# Patient Record
Sex: FEMALE | Race: OTHER | HISPANIC OR LATINO
[De-identification: names, ages, dates, MRNs, and addresses within clinical notes are randomized per-mention and may not be internally consistent; named-entity substitution may affect disease eponyms.]

---

## 2017-08-08 ENCOUNTER — APPOINTMENT (OUTPATIENT)
Dept: SURGICAL ONCOLOGY | Facility: CLINIC | Age: 72
End: 2017-08-08
Payer: MEDICARE

## 2017-08-08 ENCOUNTER — RESULT REVIEW (OUTPATIENT)
Age: 72
End: 2017-08-08

## 2017-08-08 PROBLEM — Z00.00 ENCOUNTER FOR PREVENTIVE HEALTH EXAMINATION: Status: ACTIVE | Noted: 2017-08-08

## 2017-08-08 PROCEDURE — 99205 OFFICE O/P NEW HI 60 MIN: CPT | Mod: 25

## 2017-08-08 PROCEDURE — 19083 BX BREAST 1ST LESION US IMAG: CPT

## 2017-08-09 ENCOUNTER — OTHER (OUTPATIENT)
Age: 72
End: 2017-08-09

## 2017-08-15 ENCOUNTER — APPOINTMENT (OUTPATIENT)
Dept: SURGICAL ONCOLOGY | Facility: CLINIC | Age: 72
End: 2017-08-15
Payer: MEDICARE

## 2017-08-15 PROCEDURE — 99214 OFFICE O/P EST MOD 30 MIN: CPT

## 2017-08-16 ENCOUNTER — OUTPATIENT (OUTPATIENT)
Dept: OUTPATIENT SERVICES | Facility: HOSPITAL | Age: 72
LOS: 1 days | End: 2017-08-16
Payer: COMMERCIAL

## 2017-08-16 VITALS
OXYGEN SATURATION: 100 % | RESPIRATION RATE: 16 BRPM | DIASTOLIC BLOOD PRESSURE: 92 MMHG | HEART RATE: 60 BPM | SYSTOLIC BLOOD PRESSURE: 154 MMHG | WEIGHT: 216.05 LBS | TEMPERATURE: 98 F | HEIGHT: 64 IN

## 2017-08-16 DIAGNOSIS — N63 UNSPECIFIED LUMP IN BREAST: ICD-10-CM

## 2017-08-16 DIAGNOSIS — H26.9 UNSPECIFIED CATARACT: Chronic | ICD-10-CM

## 2017-08-16 DIAGNOSIS — D24.1 BENIGN NEOPLASM OF RIGHT BREAST: ICD-10-CM

## 2017-08-16 DIAGNOSIS — Z98.891 HISTORY OF UTERINE SCAR FROM PREVIOUS SURGERY: Chronic | ICD-10-CM

## 2017-08-16 DIAGNOSIS — Z01.818 ENCOUNTER FOR OTHER PREPROCEDURAL EXAMINATION: ICD-10-CM

## 2017-08-16 LAB
ALBUMIN SERPL ELPH-MCNC: 3.6 G/DL — SIGNIFICANT CHANGE UP (ref 3.5–5)
ALP SERPL-CCNC: 104 U/L — SIGNIFICANT CHANGE UP (ref 40–120)
ALT FLD-CCNC: 16 U/L DA — SIGNIFICANT CHANGE UP (ref 10–60)
ANION GAP SERPL CALC-SCNC: 9 MMOL/L — SIGNIFICANT CHANGE UP (ref 5–17)
APTT BLD: 27.9 SEC — SIGNIFICANT CHANGE UP (ref 27.5–37.4)
AST SERPL-CCNC: 12 U/L — SIGNIFICANT CHANGE UP (ref 10–40)
BILIRUB SERPL-MCNC: 0.7 MG/DL — SIGNIFICANT CHANGE UP (ref 0.2–1.2)
BUN SERPL-MCNC: 7 MG/DL — SIGNIFICANT CHANGE UP (ref 7–18)
CALCIUM SERPL-MCNC: 8.9 MG/DL — SIGNIFICANT CHANGE UP (ref 8.4–10.5)
CHLORIDE SERPL-SCNC: 104 MMOL/L — SIGNIFICANT CHANGE UP (ref 96–108)
CO2 SERPL-SCNC: 29 MMOL/L — SIGNIFICANT CHANGE UP (ref 22–31)
CREAT SERPL-MCNC: 0.57 MG/DL — SIGNIFICANT CHANGE UP (ref 0.5–1.3)
GLUCOSE SERPL-MCNC: 81 MG/DL — SIGNIFICANT CHANGE UP (ref 70–99)
HCT VFR BLD CALC: 41.8 % — SIGNIFICANT CHANGE UP (ref 34.5–45)
HGB BLD-MCNC: 13.1 G/DL — SIGNIFICANT CHANGE UP (ref 11.5–15.5)
INR BLD: 1 RATIO — SIGNIFICANT CHANGE UP (ref 0.88–1.16)
MCHC RBC-ENTMCNC: 29.9 PG — SIGNIFICANT CHANGE UP (ref 27–34)
MCHC RBC-ENTMCNC: 31.4 GM/DL — LOW (ref 32–36)
MCV RBC AUTO: 95.4 FL — SIGNIFICANT CHANGE UP (ref 80–100)
PLATELET # BLD AUTO: 325 K/UL — SIGNIFICANT CHANGE UP (ref 150–400)
POTASSIUM SERPL-MCNC: 3.8 MMOL/L — SIGNIFICANT CHANGE UP (ref 3.5–5.3)
POTASSIUM SERPL-SCNC: 3.8 MMOL/L — SIGNIFICANT CHANGE UP (ref 3.5–5.3)
PROT SERPL-MCNC: 7.9 G/DL — SIGNIFICANT CHANGE UP (ref 6–8.3)
PROTHROM AB SERPL-ACNC: 10.9 SEC — SIGNIFICANT CHANGE UP (ref 9.8–12.7)
RBC # BLD: 4.38 M/UL — SIGNIFICANT CHANGE UP (ref 3.8–5.2)
RBC # FLD: 13.7 % — SIGNIFICANT CHANGE UP (ref 10.3–14.5)
SODIUM SERPL-SCNC: 142 MMOL/L — SIGNIFICANT CHANGE UP (ref 135–145)
WBC # BLD: 6.1 K/UL — SIGNIFICANT CHANGE UP (ref 3.8–10.5)
WBC # FLD AUTO: 6.1 K/UL — SIGNIFICANT CHANGE UP (ref 3.8–10.5)

## 2017-08-16 PROCEDURE — 85610 PROTHROMBIN TIME: CPT

## 2017-08-16 PROCEDURE — 85730 THROMBOPLASTIN TIME PARTIAL: CPT

## 2017-08-16 PROCEDURE — 71020: CPT | Mod: 26

## 2017-08-16 PROCEDURE — 71046 X-RAY EXAM CHEST 2 VIEWS: CPT

## 2017-08-16 PROCEDURE — G0463: CPT

## 2017-08-16 PROCEDURE — 85027 COMPLETE CBC AUTOMATED: CPT

## 2017-08-16 PROCEDURE — 80053 COMPREHEN METABOLIC PANEL: CPT

## 2017-08-16 RX ORDER — SODIUM CHLORIDE 9 MG/ML
3 INJECTION INTRAMUSCULAR; INTRAVENOUS; SUBCUTANEOUS EVERY 8 HOURS
Qty: 0 | Refills: 0 | Status: DISCONTINUED | OUTPATIENT
Start: 2017-08-17 | End: 2017-08-17

## 2017-08-16 NOTE — H&P PST ADULT - FAMILY HISTORY
Mother  Still living? Yes, Estimated age: 81-90  Family history of diabetes mellitus in mother, Age at diagnosis: Age Unknown     Father  Still living? No  Family history of heart attack, Age at diagnosis: Age Unknown

## 2017-08-16 NOTE — H&P PST ADULT - RS GEN PE MLT RESP DETAILS PC
clear to auscultation bilaterally/normal/respirations non-labored/breath sounds equal/airway patent/good air movement

## 2017-08-16 NOTE — H&P PST ADULT - ASSESSMENT
72-year-old female with history of hypertension, diabetes mellitus type 2 comes to PST  with reports of right breast mass found on mammogram.

## 2017-08-16 NOTE — H&P PST ADULT - NSANTHOSAYNRD_GEN_A_CORE
No. TERRI screening performed.  STOP BANG Legend: 0-2 = LOW Risk; 3-4 = INTERMEDIATE Risk; 5-8 = HIGH Risk

## 2017-08-17 ENCOUNTER — APPOINTMENT (OUTPATIENT)
Dept: SURGICAL ONCOLOGY | Facility: HOSPITAL | Age: 72
End: 2017-08-17
Payer: MEDICARE

## 2017-08-17 ENCOUNTER — RESULT REVIEW (OUTPATIENT)
Age: 72
End: 2017-08-17

## 2017-08-17 ENCOUNTER — TRANSCRIPTION ENCOUNTER (OUTPATIENT)
Age: 72
End: 2017-08-17

## 2017-08-17 ENCOUNTER — OUTPATIENT (OUTPATIENT)
Dept: OUTPATIENT SERVICES | Facility: HOSPITAL | Age: 72
LOS: 1 days | Discharge: HOME | End: 2017-08-17
Payer: COMMERCIAL

## 2017-08-17 VITALS
RESPIRATION RATE: 12 BRPM | OXYGEN SATURATION: 97 % | DIASTOLIC BLOOD PRESSURE: 63 MMHG | TEMPERATURE: 97 F | SYSTOLIC BLOOD PRESSURE: 126 MMHG | HEART RATE: 52 BPM

## 2017-08-17 VITALS
RESPIRATION RATE: 14 BRPM | WEIGHT: 216.05 LBS | HEIGHT: 64 IN | SYSTOLIC BLOOD PRESSURE: 152 MMHG | HEART RATE: 67 BPM | DIASTOLIC BLOOD PRESSURE: 81 MMHG | TEMPERATURE: 98 F | OXYGEN SATURATION: 97 %

## 2017-08-17 DIAGNOSIS — K21.9 GASTRO-ESOPHAGEAL REFLUX DISEASE WITHOUT ESOPHAGITIS: ICD-10-CM

## 2017-08-17 DIAGNOSIS — D24.1 BENIGN NEOPLASM OF RIGHT BREAST: ICD-10-CM

## 2017-08-17 DIAGNOSIS — Z79.4 LONG TERM (CURRENT) USE OF INSULIN: ICD-10-CM

## 2017-08-17 DIAGNOSIS — Z98.891 HISTORY OF UTERINE SCAR FROM PREVIOUS SURGERY: Chronic | ICD-10-CM

## 2017-08-17 DIAGNOSIS — Z91.013 ALLERGY TO SEAFOOD: ICD-10-CM

## 2017-08-17 DIAGNOSIS — E10.9 TYPE 1 DIABETES MELLITUS WITHOUT COMPLICATIONS: ICD-10-CM

## 2017-08-17 DIAGNOSIS — H26.9 UNSPECIFIED CATARACT: Chronic | ICD-10-CM

## 2017-08-17 DIAGNOSIS — I10 ESSENTIAL (PRIMARY) HYPERTENSION: ICD-10-CM

## 2017-08-17 PROCEDURE — 19301 PARTIAL MASTECTOMY: CPT | Mod: RT

## 2017-08-17 PROCEDURE — 88307 TISSUE EXAM BY PATHOLOGIST: CPT | Mod: 26

## 2017-08-17 PROCEDURE — 88307 TISSUE EXAM BY PATHOLOGIST: CPT

## 2017-08-17 PROCEDURE — 76098 X-RAY EXAM SURGICAL SPECIMEN: CPT | Mod: 26

## 2017-08-17 PROCEDURE — 19281 PERQ DEVICE BREAST 1ST IMAG: CPT | Mod: RT

## 2017-08-17 PROCEDURE — 88305 TISSUE EXAM BY PATHOLOGIST: CPT | Mod: 26

## 2017-08-17 PROCEDURE — 88305 TISSUE EXAM BY PATHOLOGIST: CPT

## 2017-08-17 PROCEDURE — 76098 X-RAY EXAM SURGICAL SPECIMEN: CPT

## 2017-08-17 PROCEDURE — 19281 PERQ DEVICE BREAST 1ST IMAG: CPT

## 2017-08-17 RX ORDER — IBUPROFEN 200 MG
1 TABLET ORAL
Qty: 0 | Refills: 0 | COMMUNITY

## 2017-08-17 RX ORDER — METFORMIN HYDROCHLORIDE 850 MG/1
1 TABLET ORAL
Qty: 0 | Refills: 0 | COMMUNITY

## 2017-08-17 RX ORDER — ACETAMINOPHEN 500 MG
650 TABLET ORAL EVERY 6 HOURS
Qty: 0 | Refills: 0 | Status: DISCONTINUED | OUTPATIENT
Start: 2017-08-17 | End: 2017-08-25

## 2017-08-17 RX ORDER — AMLODIPINE BESYLATE AND BENAZEPRIL HYDROCHLORIDE 10; 20 MG/1; MG/1
1 CAPSULE ORAL
Qty: 0 | Refills: 0 | COMMUNITY

## 2017-08-17 RX ORDER — ACETAMINOPHEN 500 MG
2 TABLET ORAL
Qty: 0 | Refills: 0 | COMMUNITY
Start: 2017-08-17

## 2017-08-17 RX ORDER — RANITIDINE HYDROCHLORIDE 150 MG/1
1 TABLET, FILM COATED ORAL
Qty: 0 | Refills: 0 | COMMUNITY

## 2017-08-17 NOTE — ASU DISCHARGE PLAN (ADULT/PEDIATRIC). - NURSING INSTRUCTIONS
keep dressing dry, clean, intact, for 1 days. After 1 days, remove dressing and leave steri strips on. You can shower with steri strips on. If it fall off after shower is OK, if not you leave it there, it will fall off by itself in 2-3 days.

## 2017-08-17 NOTE — ASU DISCHARGE PLAN (ADULT/PEDIATRIC). - SPECIAL INSTRUCTIONS
You may remove outer dressing in 24 hours and shower normal at that time. Keep dressing dry until then. when you remove outer dressing, leave steri strips in place, they will fall off on their own. Do not scrub wound, pat dry.

## 2017-08-17 NOTE — BRIEF OPERATIVE NOTE - SPECIMENS
1) right breast mass  2) superior margin  3) medial margin  4) inferior margin  5) lateral margin  6) deep margin

## 2017-08-17 NOTE — ASU PREOP CHECKLIST - AS TEMP SITE
Routing to PCP.  See note below from patient. Do you want me to schedule a 40 minute appointment for this patient?    Deana Flores       oral

## 2017-08-17 NOTE — ASU PREOP CHECKLIST - 1.
pt. was awaiting for authorization preop--nurse manager Dayami aware, as soon as authorization obtained pt.'s ng. assessment + preop checklist done , pt. sent for needle loc at 10:25am. pt. was awaiting for authorization preop-----nurse manager Dayami aware, as soon as authorization obtained pt.'s ng. assessment + preop checklist done , pt. sent for needle loc at 10:25am.

## 2017-08-21 LAB — SURGICAL PATHOLOGY FINAL REPORT - CH: SIGNIFICANT CHANGE UP

## 2017-08-29 ENCOUNTER — APPOINTMENT (OUTPATIENT)
Dept: SURGICAL ONCOLOGY | Facility: CLINIC | Age: 72
End: 2017-08-29
Payer: MEDICARE

## 2017-08-29 PROCEDURE — 99024 POSTOP FOLLOW-UP VISIT: CPT

## 2018-06-12 ENCOUNTER — APPOINTMENT (OUTPATIENT)
Dept: SURGICAL ONCOLOGY | Facility: CLINIC | Age: 73
End: 2018-06-12

## 2018-07-03 ENCOUNTER — APPOINTMENT (OUTPATIENT)
Dept: SURGICAL ONCOLOGY | Facility: CLINIC | Age: 73
End: 2018-07-03
Payer: MEDICARE

## 2018-07-03 PROCEDURE — 99214 OFFICE O/P EST MOD 30 MIN: CPT

## 2019-04-24 NOTE — H&P PST ADULT - ADMIT DATE
16-Aug-2017
no congestion/no vomiting/no dizziness/no fever/no shortness of breath/no chills/no diaphoresis/no nausea/no syncope

## 2019-06-14 NOTE — H&P PST ADULT - SKIN/BREAST
PATIENT INFORMATION     Eat organic fruits and vegetables.  Avoid processed food/fast food.  Avoid sugary drinks and desserts.  If possible, exercise is recommended 45 minutes per day.   For Mental Stress - Meditation is recommended 30 minutes per day. Website - [email or web address].  Avoid exposure to tobacco/marijuana.  Avoid excess caffeine or alcohol use.     Imaging Scheduling - Please call     Labs and Xrays: Basement   Hours - 7am to 5 pm M-F and until Noon on Saturday.    For fasting labs - Do not eat 8 hours prior to blood draw - ok to drink water and take nondiabetes medications.      Do not hesitate to call if you have any matter that concerns you.     There are no preventive care reminders to display for this patient.      You may get a survey in the mail/online.   Please take time to fill it and give us your feedback on your experience with me.   Thanks for choosing me to provide your health care today.          
details…

## 2019-07-08 PROBLEM — E11.9 TYPE 2 DIABETES MELLITUS WITHOUT COMPLICATIONS: Chronic | Status: ACTIVE | Noted: 2017-08-16

## 2019-07-08 PROBLEM — Z86.79 PERSONAL HISTORY OF OTHER DISEASES OF THE CIRCULATORY SYSTEM: Chronic | Status: ACTIVE | Noted: 2017-08-16

## 2019-07-08 PROBLEM — K21.9 GASTRO-ESOPHAGEAL REFLUX DISEASE WITHOUT ESOPHAGITIS: Chronic | Status: ACTIVE | Noted: 2017-08-16

## 2019-07-09 ENCOUNTER — APPOINTMENT (OUTPATIENT)
Dept: SURGICAL ONCOLOGY | Facility: CLINIC | Age: 74
End: 2019-07-09
Payer: MEDICARE

## 2019-07-09 PROCEDURE — 99214 OFFICE O/P EST MOD 30 MIN: CPT

## 2020-07-28 ENCOUNTER — APPOINTMENT (OUTPATIENT)
Dept: SURGICAL ONCOLOGY | Facility: CLINIC | Age: 75
End: 2020-07-28

## 2020-08-04 ENCOUNTER — APPOINTMENT (OUTPATIENT)
Dept: SURGICAL ONCOLOGY | Facility: CLINIC | Age: 75
End: 2020-08-04
Payer: MEDICARE

## 2020-08-04 PROCEDURE — 99214 OFFICE O/P EST MOD 30 MIN: CPT

## 2021-04-06 NOTE — ASU DISCHARGE PLAN (ADULT/PEDIATRIC). - MEDICATION SUMMARY - MEDICATIONS TO TAKE
I will START or STAY ON the medications listed below when I get home from the hospital:    acetaminophen 325 mg oral tablet  -- 2 tab(s) by mouth every 6 hours, As needed, Moderate Pain (4 - 6)  -- Indication: For for pain as needed    Motrin  mg oral tablet  -- 1-2 tab(s) by mouth every 6 hours as needed for pain. Can be taken in addition to Tylenol. Take with food  -- Indication: For for pain as needed    metFORMIN 500 mg oral tablet, extended release  -- 1 tab(s) by mouth once a day  -- Indication: For home medication. Discuss any questions with PCP    amlodipine-benazepril 10 mg-20 mg oral capsule  -- 1 cap(s) by mouth once a day  -- Indication: For home medication. Discuss any questions with PCP    raNITIdine 150 mg oral tablet  -- 1 tab(s) by mouth 2 times a day  -- Indication: For home medication. Discuss any questions with PCP patient

## 2021-08-10 ENCOUNTER — APPOINTMENT (OUTPATIENT)
Dept: SURGICAL ONCOLOGY | Facility: CLINIC | Age: 76
End: 2021-08-10
Payer: MEDICARE

## 2021-08-10 PROCEDURE — 99214 OFFICE O/P EST MOD 30 MIN: CPT

## 2022-08-03 NOTE — ASU PREOP CHECKLIST - WEIGHT IN KG
· ESRD on HD TTS    Dialysis as above    Results from last 7 days   Lab Units 08/03/22  0620 08/02/22  0722   BUN mg/dL 39* 74*   CREATININE mg/dL 5 33* 7 85*   EGFR ml/min/1 73sq m 9 5 98

## 2022-08-16 ENCOUNTER — APPOINTMENT (OUTPATIENT)
Dept: SURGICAL ONCOLOGY | Facility: CLINIC | Age: 77
End: 2022-08-16

## 2022-08-30 ENCOUNTER — APPOINTMENT (OUTPATIENT)
Dept: SURGICAL ONCOLOGY | Facility: CLINIC | Age: 77
End: 2022-08-30

## 2022-08-30 PROCEDURE — 99214 OFFICE O/P EST MOD 30 MIN: CPT

## 2023-09-05 ENCOUNTER — APPOINTMENT (OUTPATIENT)
Dept: SURGICAL ONCOLOGY | Facility: CLINIC | Age: 78
End: 2023-09-05
Payer: MEDICARE

## 2023-09-05 PROCEDURE — 99214 OFFICE O/P EST MOD 30 MIN: CPT

## 2024-09-10 ENCOUNTER — APPOINTMENT (OUTPATIENT)
Dept: SURGICAL ONCOLOGY | Facility: CLINIC | Age: 79
End: 2024-09-10